# Patient Record
Sex: MALE | Race: WHITE | NOT HISPANIC OR LATINO | Employment: FULL TIME | URBAN - METROPOLITAN AREA
[De-identification: names, ages, dates, MRNs, and addresses within clinical notes are randomized per-mention and may not be internally consistent; named-entity substitution may affect disease eponyms.]

---

## 2018-12-07 ENCOUNTER — OFFICE VISIT (OUTPATIENT)
Dept: OBGYN CLINIC | Facility: CLINIC | Age: 68
End: 2018-12-07
Payer: COMMERCIAL

## 2018-12-07 ENCOUNTER — APPOINTMENT (OUTPATIENT)
Dept: RADIOLOGY | Facility: CLINIC | Age: 68
End: 2018-12-07
Payer: COMMERCIAL

## 2018-12-07 VITALS — HEIGHT: 70 IN

## 2018-12-07 DIAGNOSIS — S76.311A RIGHT HAMSTRING STRAIN, INITIAL ENCOUNTER: ICD-10-CM

## 2018-12-07 DIAGNOSIS — M25.561 RIGHT KNEE PAIN, UNSPECIFIED CHRONICITY: ICD-10-CM

## 2018-12-07 DIAGNOSIS — M17.11 PRIMARY OSTEOARTHRITIS OF RIGHT KNEE: Primary | ICD-10-CM

## 2018-12-07 PROCEDURE — 73562 X-RAY EXAM OF KNEE 3: CPT

## 2018-12-07 PROCEDURE — 99204 OFFICE O/P NEW MOD 45 MIN: CPT | Performed by: ORTHOPAEDIC SURGERY

## 2018-12-07 PROCEDURE — 20610 DRAIN/INJ JOINT/BURSA W/O US: CPT | Performed by: ORTHOPAEDIC SURGERY

## 2018-12-07 RX ORDER — AMLODIPINE BESYLATE 10 MG/1
TABLET ORAL
COMMUNITY
Start: 2017-09-27

## 2018-12-07 RX ORDER — DEXAMETHASONE SODIUM PHOSPHATE 100 MG/10ML
40 INJECTION INTRAMUSCULAR; INTRAVENOUS
Status: COMPLETED | OUTPATIENT
Start: 2018-12-07 | End: 2018-12-07

## 2018-12-07 RX ORDER — LOSARTAN POTASSIUM 50 MG/1
50 TABLET ORAL DAILY
Refills: 0 | COMMUNITY
Start: 2018-09-29

## 2018-12-07 RX ORDER — LIDOCAINE HYDROCHLORIDE 10 MG/ML
4 INJECTION, SOLUTION INFILTRATION; PERINEURAL
Status: COMPLETED | OUTPATIENT
Start: 2018-12-07 | End: 2018-12-07

## 2018-12-07 RX ADMIN — DEXAMETHASONE SODIUM PHOSPHATE 40 MG: 100 INJECTION INTRAMUSCULAR; INTRAVENOUS at 10:08

## 2018-12-07 RX ADMIN — LIDOCAINE HYDROCHLORIDE 4 ML: 10 INJECTION, SOLUTION INFILTRATION; PERINEURAL at 10:08

## 2018-12-07 NOTE — PROGRESS NOTES
Assessment/Plan:  1  Primary osteoarthritis of right knee     2  Right hamstring strain, initial encounter     3  Right knee pain, unspecified chronicity  XR knee 3 vw right non injury       Scribe Attestation    I,:   Matthew Lopez am acting as a scribe while in the presence of the attending physician :        I,:   Melissa Barton MD personally performed the services described in this documentation    as scribed in my presence :              John Fairly upon examination and review of x-rays today demonstrates signs and symptoms consistent with tricompartmental osteoarthritis of the right knee  He does demonstrate mild tenderness along the medial hamstrings     He does demonstrate stability on exam however does demonstrate a slight flexion contracture of approximately 5°  I did explain to John Cecelia that his x-rays do demonstrate severe patellofemoral osteoarthritis as well as medial lateral compartment osteoarthritis  He does have slight joint space between the medial and lateral joint compartments  Johnmoriah Rai at this time is not quite at the state for a total knee replacement  I would like to treat John Fairly conservatively with home exercises to help strengthen the muscles around the knee as well as the hamstring  I also provided John Rai with a cortisone injection with an anterior lateral approach  He tolerated the injection well with no complications  I also noted the John Fairly I would like him to ice his knee at night he may heat the knee in the morning to help loosen it up  I noted to John Rai that should he continue to improve he may continue treating himself at home  However, should he experience recurrent symptoms or does not demonstrate any improvement after 6 weeks he may follow up with me to discuss the Euflexxa series injections      Large joint arthrocentesis  Date/Time: 12/7/2018 10:08 AM  Consent given by: patient  Site marked: site marked  Timeout: Immediately prior to procedure a time out was called to verify the correct patient, procedure, equipment, support staff and site/side marked as required   Supporting Documentation  Indications: pain   Procedure Details  Location: knee - R knee  Preparation: Patient was prepped and draped in the usual sterile fashion  Needle size: 22 G  Ultrasound guidance: no  Approach: anterolateral  Medications administered: 4 mL lidocaine 1 %; 40 mg dexamethasone 100 mg/10 mL    Patient tolerance: patient tolerated the procedure well with no immediate complications  Dressing:  Sterile dressing applied        Subjective:   Kadi Laura is a 70 y o  male who presents for initial evaluation of his right knee  He states that he began to experience posterior right knee pain approximately 8 weeks ago  He states that he was getting up from seated position and felt a pulling tearing sensation posterior and medial aspect of his knee  He states that his pain has improved since then however notes pain to palpation along the medial hamstring  He does note global anterior knee pain as well after periods of prolonged weight-bearing  He denies any significant injuries to the knee sides that pulling sensation at the hamstring 8 weeks ago  He notes that rest does alleviate his symptoms  Today denies any distal paresthesias  Review of Systems   Constitutional: Negative for chills, fever and unexpected weight change  HENT: Negative for hearing loss, nosebleeds and sore throat  Eyes: Negative for pain, redness and visual disturbance  Respiratory: Negative for cough, shortness of breath and wheezing  Cardiovascular: Negative for chest pain, palpitations and leg swelling  Gastrointestinal: Negative for abdominal pain, nausea and vomiting  Endocrine: Negative for polyphagia and polyuria  Genitourinary: Negative for dysuria and hematuria  Musculoskeletal: Positive for arthralgias, joint swelling and myalgias  See HPI   Skin: Negative for rash and wound     Neurological: Negative for dizziness, numbness and headaches  Psychiatric/Behavioral: Negative for decreased concentration and suicidal ideas  The patient is not nervous/anxious  Past Medical History:   Diagnosis Date    Asthma     Hypertension        No past surgical history on file  No family history on file  Social History     Occupational History    Not on file  Social History Main Topics    Smoking status: Never Smoker    Smokeless tobacco: Never Used    Alcohol use Yes    Drug use: No    Sexual activity: Not on file         Current Outpatient Prescriptions:     ADVAIR DISKUS 250-50 MCG/DOSE inhaler, Inhale 1 puff 2 (two) times a day, Disp: , Rfl: 3    amLODIPine (NORVASC) 10 mg tablet, TAKE ONE TAB TWICE DAILY, Disp: , Rfl:     losartan (COZAAR) 50 mg tablet, Take 50 mg by mouth daily, Disp: , Rfl: 0    Allergies   Allergen Reactions    Penicillins        Objective: There were no vitals filed for this visit  Right Knee Exam     Tenderness   Right knee tenderness location: Semimembranosus tendon, popliteal fossa  , posterior medial joint line  Range of Motion   Extension: 5   Flexion: 140     Tests   Drawer:       Anterior - negative    Posterior - negative  Varus: negative        Other   Erythema: absent  Scars: absent  Sensation: normal  Pulse: present  Swelling: mild    Comments:  Slight laxity with valgus stress at 30° with good endpoint  No laxity with valgus stress with the knee at 0°  Physical Exam   Constitutional: He is oriented to person, place, and time  He appears well-developed and well-nourished  HENT:   Head: Normocephalic and atraumatic  Eyes: Conjunctivae are normal  Right eye exhibits no discharge  Left eye exhibits no discharge  Neck: Normal range of motion  Neck supple  Cardiovascular: Normal rate and intact distal pulses  Pulmonary/Chest: Effort normal  No respiratory distress     Musculoskeletal:   As noted in HPI   Neurological: He is alert and oriented to person, place, and time  Skin: Skin is warm and dry  Psychiatric: He has a normal mood and affect  His behavior is normal  Judgment and thought content normal    Nursing note and vitals reviewed  I have personally reviewed pertinent films in PACS and my interpretation is as follows:    X-rays of the right knee demonstrates severe tricompartmental osteoarthritis

## 2018-12-07 NOTE — PATIENT INSTRUCTIONS
Hamstring Exercises   AMBULATORY CARE:   Hamstring exercises  help strengthen and stretch the muscles that support your lower back, hips, and knee  This decreases pain, improves movement, and decreases your risk of future injury  Contact your healthcare provider if:   · You have sharp or worsening pain during exercise or at rest     · You have questions or concern about your condition, care, or exercise program   Exercise safely:   · Move slowly and smoothly  Avoid fast or jerky motions  This will help prevent another injury  · Breathe normally  Do not hold your breath  It is important to breathe in and out so you do not tense up during exercise  Tension could prevent your muscles from stretching  · Do the exercises and stretches on both legs  Do this so the muscles on both legs remain strong and flexible  · Stop if you feel sharp pain or an increase in pain  Stop the exercise and contact your healthcare provider if you have these symptoms  It is normal to feel some discomfort, such as a dull ache, during exercise  Regular exercise will help decrease your discomfort over time  · Warm up before you stretch and exercise  This will help prevent an injury  Walk or ride a stationary bike for 5 to 10 minutes  How to perform stretching exercises:  Ask your healthcare provider how often to do these stretches:  · Hamstring stretch with a towel:  Lie on your back on the floor  Bend both legs so your feet rest flat  Lift one leg off the floor and loop a towel around your foot  Grasp the ends of the towel and slowly straighten your lifted leg  Use the towel to gently pull your leg toward you until you feel the stretch  Keep your leg straight and your foot flexed toward your body  Hold for 30 seconds  Use a longer towel if needed  · Sitting hamstring stretch:  Sit on the floor with both legs straight in front of you  Do not point your toes or flex your feet   Place your palms on the floor and slide your hands forward until you feel the stretch  Keep your back straight and do not lock your knees  Hold the stretch for 30 seconds  · Standing hamstring stretch:  Stand with your feet hips distance apart  Place one leg so it rests on a firm surface, such as a table or chair  Keep your toes pointing up  Slide both hands down the outside of your leg until you feel a stretch  Keep your chest lifted and your back straight  Hold for 30 seconds  · Sitting wide-leg stretch:  Sit on the floor and extend your legs as wide as possible  Keep your legs straight and lean over one leg  Slide your hands forward until you feel a stretch  Keep your chest lifted and your back straight  Hold for 30 seconds  How to perform strengthening exercises:  Always do strengthening exercises after you stretch  As you get stronger your healthcare provider may tell you to you add weights or more repetitions to your strengthening exercises  · Hamstring curls:  Place your hand on a wall or the back of a chair for balance  Place the weight in one of your legs  Lift the other leg and raise your heel toward your buttocks  Hold for 5 seconds  Slowly lower your leg until it is a few inches off the floor  Do 3 sets of 10  Repeat on other side  · Straight leg raise:  Lie on the floor with your face down  Rest your forehead on your folded arms  Keep your body in a straight line  Keep your hip bones on the floor, and tighten the butt and thigh muscles of your injured leg  Keep one leg straight and raise it toward the ceiling as high as you can  Hold for 5 seconds  Slowly return to the starting position  Do 3 sets of 10  Repeat on other side  · Half squats:  Stand with your feet shoulder distance apart  Rest your hands on the front of your thighs or reach them out in front of you  You may hold on to the back of a chair or wall for balance   Keep your chest lifted and lower your hips about 10 inches, as if you are going to sit  Make sure your weight is in your heels and hold for 5 seconds  Keep your weight in your heels and slowly stand  Do 3 sets of 10  Follow up with your healthcare provider as directed:  Write down your questions so you remember to ask them during your visits  © 2017 [employer] Information is for End User's use only and may not be sold, redistributed or otherwise used for commercial purposes  All illustrations and images included in CareNotes® are the copyrighted property of [employer] , Copyright Agent  or Paulo Lainez  The above information is an  only  It is not intended as medical advice for individual conditions or treatments  Talk to your doctor, nurse or pharmacist before following any medical regimen to see if it is safe and effective for you